# Patient Record
Sex: FEMALE
[De-identification: names, ages, dates, MRNs, and addresses within clinical notes are randomized per-mention and may not be internally consistent; named-entity substitution may affect disease eponyms.]

---

## 2019-04-21 ENCOUNTER — HOSPITAL ENCOUNTER (INPATIENT)
Dept: HOSPITAL 92 - SCSER | Age: 69
LOS: 4 days | Discharge: HOME | DRG: 291 | End: 2019-04-25
Attending: FAMILY MEDICINE | Admitting: FAMILY MEDICINE
Payer: SELF-PAY

## 2019-04-21 VITALS — BODY MASS INDEX: 41.7 KG/M2

## 2019-04-21 DIAGNOSIS — I50.33: ICD-10-CM

## 2019-04-21 DIAGNOSIS — E87.1: ICD-10-CM

## 2019-04-21 DIAGNOSIS — D63.1: ICD-10-CM

## 2019-04-21 DIAGNOSIS — R31.9: ICD-10-CM

## 2019-04-21 DIAGNOSIS — K76.0: ICD-10-CM

## 2019-04-21 DIAGNOSIS — Z79.4: ICD-10-CM

## 2019-04-21 DIAGNOSIS — K80.20: ICD-10-CM

## 2019-04-21 DIAGNOSIS — I13.0: Primary | ICD-10-CM

## 2019-04-21 DIAGNOSIS — N17.9: ICD-10-CM

## 2019-04-21 DIAGNOSIS — K21.9: ICD-10-CM

## 2019-04-21 DIAGNOSIS — E11.22: ICD-10-CM

## 2019-04-21 DIAGNOSIS — R33.9: ICD-10-CM

## 2019-04-21 DIAGNOSIS — N18.9: ICD-10-CM

## 2019-04-21 DIAGNOSIS — N39.0: ICD-10-CM

## 2019-04-21 DIAGNOSIS — Z88.0: ICD-10-CM

## 2019-04-21 DIAGNOSIS — E11.21: ICD-10-CM

## 2019-04-21 LAB
ALBUMIN SERPL BCG-MCNC: 3.3 G/DL (ref 3.4–4.8)
ALP SERPL-CCNC: 140 U/L (ref 40–150)
ALT SERPL W P-5'-P-CCNC: 10 U/L (ref 8–55)
ANION GAP SERPL CALC-SCNC: 14 MMOL/L (ref 10–20)
ANISOCYTOSIS BLD QL SMEAR: (no result) (100X)
AST SERPL-CCNC: 8 U/L (ref 5–34)
BASOPHILS # BLD AUTO: 0.1 THOU/UL (ref 0–0.2)
BASOPHILS NFR BLD AUTO: 1.2 % (ref 0–1)
BILIRUB SERPL-MCNC: 0.3 MG/DL (ref 0.2–1.2)
BUN SERPL-MCNC: 64 MG/DL (ref 9.8–20.1)
CALCIUM SERPL-MCNC: 8.5 MG/DL (ref 7.8–10.44)
CHLORIDE SERPL-SCNC: 102 MMOL/L (ref 98–107)
CK SERPL-CCNC: 33 U/L (ref 29–168)
CO2 SERPL-SCNC: 20 MMOL/L (ref 23–31)
CREAT CL PREDICTED SERPL C-G-VRATE: 0 ML/MIN (ref 70–130)
EOSINOPHIL # BLD AUTO: 0.2 THOU/UL (ref 0–0.7)
EOSINOPHIL NFR BLD AUTO: 2.5 % (ref 0–10)
GLOBULIN SER CALC-MCNC: 4.6 G/DL (ref 2.4–3.5)
GLUCOSE SERPL-MCNC: 108 MG/DL (ref 80–115)
HGB BLD-MCNC: 7.5 G/DL (ref 12–16)
LYMPHOCYTES # BLD: 1.4 THOU/UL (ref 1.2–3.4)
LYMPHOCYTES NFR BLD AUTO: 21.1 % (ref 21–51)
MCH RBC QN AUTO: 25 PG (ref 27–31)
MCV RBC AUTO: 80.9 FL (ref 78–98)
MDIFF COMPLETE?: YES
MONOCYTES # BLD AUTO: 0.4 THOU/UL (ref 0.11–0.59)
MONOCYTES NFR BLD AUTO: 5.2 % (ref 0–10)
NEUTROPHILS # BLD AUTO: 4.8 THOU/UL (ref 1.4–6.5)
NEUTROPHILS NFR BLD AUTO: 70.1 % (ref 42–75)
PLATELET # BLD AUTO: 108 THOU/UL (ref 130–400)
POLYCHROMASIA BLD QL SMEAR: (no result) (100X)
POTASSIUM SERPL-SCNC: 5.1 MMOL/L (ref 3.5–5.1)
RBC # BLD AUTO: 3.01 MILL/UL (ref 4.2–5.4)
SODIUM SERPL-SCNC: 131 MMOL/L (ref 136–145)
TROPONIN I SERPL DL<=0.01 NG/ML-MCNC: 0.01 NG/ML (ref ?–0.03)
TROPONIN I SERPL DL<=0.01 NG/ML-MCNC: 0.02 NG/ML (ref ?–0.03)
WBC # BLD AUTO: 6.8 THOU/UL (ref 4.8–10.8)

## 2019-04-21 PROCEDURE — 83540 ASSAY OF IRON: CPT

## 2019-04-21 PROCEDURE — 36415 COLL VENOUS BLD VENIPUNCTURE: CPT

## 2019-04-21 PROCEDURE — 76770 US EXAM ABDO BACK WALL COMP: CPT

## 2019-04-21 PROCEDURE — 80061 LIPID PANEL: CPT

## 2019-04-21 PROCEDURE — 86901 BLOOD TYPING SEROLOGIC RH(D): CPT

## 2019-04-21 PROCEDURE — 83550 IRON BINDING TEST: CPT

## 2019-04-21 PROCEDURE — 87077 CULTURE AEROBIC IDENTIFY: CPT

## 2019-04-21 PROCEDURE — 87086 URINE CULTURE/COLONY COUNT: CPT

## 2019-04-21 PROCEDURE — 82607 VITAMIN B-12: CPT

## 2019-04-21 PROCEDURE — 85025 COMPLETE CBC W/AUTO DIFF WBC: CPT

## 2019-04-21 PROCEDURE — 93798 PHYS/QHP OP CAR RHAB W/ECG: CPT

## 2019-04-21 PROCEDURE — 83036 HEMOGLOBIN GLYCOSYLATED A1C: CPT

## 2019-04-21 PROCEDURE — 96374 THER/PROPH/DIAG INJ IV PUSH: CPT

## 2019-04-21 PROCEDURE — 80053 COMPREHEN METABOLIC PANEL: CPT

## 2019-04-21 PROCEDURE — 83880 ASSAY OF NATRIURETIC PEPTIDE: CPT

## 2019-04-21 PROCEDURE — 85610 PROTHROMBIN TIME: CPT

## 2019-04-21 PROCEDURE — 80198 ASSAY OF THEOPHYLLINE: CPT

## 2019-04-21 PROCEDURE — 82550 ASSAY OF CK (CPK): CPT

## 2019-04-21 PROCEDURE — 93005 ELECTROCARDIOGRAM TRACING: CPT

## 2019-04-21 PROCEDURE — 82274 ASSAY TEST FOR BLOOD FECAL: CPT

## 2019-04-21 PROCEDURE — 36416 COLLJ CAPILLARY BLOOD SPEC: CPT

## 2019-04-21 PROCEDURE — 93306 TTE W/DOPPLER COMPLETE: CPT

## 2019-04-21 PROCEDURE — 84484 ASSAY OF TROPONIN QUANT: CPT

## 2019-04-21 PROCEDURE — 80048 BASIC METABOLIC PNL TOTAL CA: CPT

## 2019-04-21 PROCEDURE — 83735 ASSAY OF MAGNESIUM: CPT

## 2019-04-21 PROCEDURE — 84100 ASSAY OF PHOSPHORUS: CPT

## 2019-04-21 PROCEDURE — 82746 ASSAY OF FOLIC ACID SERUM: CPT

## 2019-04-21 PROCEDURE — 84540 ASSAY OF URINE/UREA-N: CPT

## 2019-04-21 PROCEDURE — 82728 ASSAY OF FERRITIN: CPT

## 2019-04-21 PROCEDURE — 86900 BLOOD TYPING SEROLOGIC ABO: CPT

## 2019-04-21 PROCEDURE — 85060 BLOOD SMEAR INTERPRETATION: CPT

## 2019-04-21 PROCEDURE — 87186 SC STD MICRODIL/AGAR DIL: CPT

## 2019-04-21 PROCEDURE — 86850 RBC ANTIBODY SCREEN: CPT

## 2019-04-21 PROCEDURE — 82570 ASSAY OF URINE CREATININE: CPT

## 2019-04-21 PROCEDURE — 81001 URINALYSIS AUTO W/SCOPE: CPT

## 2019-04-21 PROCEDURE — 71045 X-RAY EXAM CHEST 1 VIEW: CPT

## 2019-04-21 RX ADMIN — INSULIN HUMAN SCH: 100 INJECTION, SUSPENSION SUBCUTANEOUS at 22:57

## 2019-04-21 NOTE — RAD
Exam: Chest one view



HISTORY:Edema



Comparison: None



FINDINGS:



Lungs: Interstitial opacities bilaterally

Cardiac silhouette:Enlarged

Pulmonary vessels: Engorged

Pleural Spaces: Bilateral pleural fluid

Pneumothorax: None



Osseous abnormalities: None of acuity.



IMPRESSION: Decompensated CHF. Follow-up to resolution recommended.



Reported By: Donell Chapa 

Electronically Signed:  4/21/2019 1:08 PM

## 2019-04-21 NOTE — PDOC.FPRHP
- History


PMHx:


 


PSHx: 





FHx:


 


Social:


 








- Vital signs


BP: []  HR: [] RR: [] Tmax: [] Pox: []% on []  Wt: []   








FMR H&P: Results





- Labs


Result Diagrams: 


 04/21/19 12:50





 04/21/19 12:50


Lab results: 


 











WBC  6.8 thou/uL (4.8-10.8)   04/21/19  12:50    


 


Hgb  7.5 g/dL (12.0-16.0)  L  04/21/19  12:50    


 


Hct  24.4 % (36.0-47.0)  L  04/21/19  12:50    


 


MCV  80.9 fL (78.0-98.0)   04/21/19  12:50    


 


Plt Count  108 thou/uL (130-400)  L  04/21/19  12:50    


 


Neutrophils %  70.1 % (42.0-75.0)   04/21/19  12:50    


 


Sodium  131 mmol/L (136-145)  L  04/21/19  12:50    


 


Potassium  5.1 mmol/L (3.5-5.1)   04/21/19  12:50    


 


Chloride  102 mmol/L ()   04/21/19  12:50    


 


Carbon Dioxide  20 mmol/L (23-31)  L  04/21/19  12:50    


 


BUN  64 mg/dL (9.8-20.1)  H  04/21/19  12:50    


 


Creatinine  3.13 mg/dL (0.6-1.1)  H  04/21/19  12:50    


 


Glucose  108 mg/dL ()   04/21/19  12:50    


 


Calcium  8.5 mg/dL (7.8-10.44)   04/21/19  12:50    


 


Total Bilirubin  0.3 mg/dL (0.2-1.2)   04/21/19  12:50    


 


AST  8 U/L (5-34)   04/21/19  12:50    


 


ALT  10 U/L (8-55)   04/21/19  12:50    


 


Alkaline Phosphatase  140 U/L ()   04/21/19  12:50    


 


Creatine Kinase  33 U/L ()   04/21/19  12:50    


 


B-Natriuretic Peptide  492.9 pg/mL (0-100)  H  04/21/19  12:50    


 


Serum Total Protein  7.9 g/dL (6.0-8.3)   04/21/19  12:50    


 


Albumin  3.3 g/dL (3.4-4.8)  L  04/21/19  12:50    














FMR H&P: A/P





- Problem List


(1) Acute exacerbation of CHF (congestive heart failure)


Current Visit: Yes   Status: Acute   Code(s): I50.9 - HEART FAILURE, 

UNSPECIFIED   





FMR H&P: Upper Level





- Plan


Date/Time: 04/21/19 1406








I, [], have evaluated this patient and agree with findings/plan as outlined by 

intern resident. Pertinent changes/additions are listed here.

## 2019-04-21 NOTE — PDOC.FPRHP
- History of Present Illness


Chief Complaint: leg swelling, sob


History of Present Illness: 





68yo pleasant F with supportive family at [facility] with pmh of CKD, chronic 

hyponatremia presents with 4 week hx of worsening bilat LE edema and SOB. SOB 

is exacerbated at night (though not necessarily by laying down) and edema has 

been intermittent with no transforming factors. Otherwise pt is asymptomatic on 

extensive ROS as listed below. No Hx of CHF, no Hx of COPD.





Of note pt is from [place] and is on travelers visa here since 2016 visiting her 

son. She has no PCP but regularly calls her daughter in [place] who is a doctor 

and occasionally goes to health [place]. She self refers to get labwork done and 

then calls her daughter with the results. Her son reports that he and the 

patients daughter have been trying to manage her healthcare themselves but it 

has "gotten a little out of hand".


ED Course: 





Lasix 80mg IV





- Allergies/Adverse Reactions


 Allergies











Allergy/AdvReac Type Severity Reaction Status Date / Time


 


Penicillins Allergy   Verified 04/21/19 21:44














- Home Medications


 











 Medication  Instructions  Recorded  Confirmed  Type


 


Insulin NPH Hum/Reg Insulin HM 8 unit SQ HS 04/21/19 04/21/19 History





[Novolin 70-30 100 Unit/ml Vial]    


 


Insulin NPH Hum/Reg Insulin HM 10 unit SQ QAM 04/21/19 04/21/19 History





[Novolin 70-30 100 Unit/ml Vial]    


 


Pantoprazole [Protonix] 40 mg PO DAILY 04/21/19 04/21/19 History














- History


PMHx: DM, GERD, fatty liver, chronic cholelithiasis, CKD, chronic hyponatremia, 

"LVF"


 


PSHx: none





FHx: none


 


Social: no T/A/D





Allergy: PCN


 








- Review of Systems


General: reports: fatigue.  denies: fever/chills


Eyes: denies: eye pain, vision changes


ENT: denies: nasal congestion, rhinorrhea


Respiratory: reports: shortness of breath.  denies: cough, congestion


Cardiovascular: reports: edema.  denies: chest pain, palpitation


Gastrointestinal: denies: nausea, vomiting


Genitourinary: denies: incontinence, dysuria


Skin: denies: rashes, lesions


Musculoskeletal: denies: pain, tenderness


Neurological: denies: syncope, seizure


Psychological: denies: anxiety, depression





- Vital signs





/90, HR 72, RR 20, O2 96 on RA, Temp 97.8, weight 106kg





- Physical Exam


Constitutional: NAD, awake, alert and oriented


HEENT: EOMI, grossly normal vision, grossly normal hearing


Neck: supple, trachea midline


Chest: no-tender to palpation


Heart: RRR, other (grade 3/6 holosystolic murmur)


Lungs: good air movement, other (inspiratory crackles bilaterally to mid lung 

lvl)


Abdomen: soft, non-tender


Musculoskeletal: normal structure, normal tone


Neurological: no focal deficit, CN II-XII intact, normal sensation


Skin: no rash/lesions, good turgor


Heme/Lymphatic: no purpura, no petechia


Psychiatric: normal mood and affect, good judgment and insight





FMR H&P: Results





- Labs


Result Diagrams: 


 04/22/19 05:49





 04/22/19 05:49


Lab results: 


 











WBC  6.8 thou/uL (4.8-10.8)   04/21/19  12:50    


 


Hgb  7.5 g/dL (12.0-16.0)  L  04/21/19  12:50    


 


Hct  24.4 % (36.0-47.0)  L  04/21/19  12:50    


 


MCV  80.9 fL (78.0-98.0)   04/21/19  12:50    


 


Plt Count  108 thou/uL (130-400)  L  04/21/19  12:50    


 


Neutrophils %  70.1 % (42.0-75.0)   04/21/19  12:50    


 


Sodium  131 mmol/L (136-145)  L  04/21/19  12:50    


 


Potassium  5.1 mmol/L (3.5-5.1)   04/21/19  12:50    


 


Chloride  102 mmol/L ()   04/21/19  12:50    


 


Carbon Dioxide  20 mmol/L (23-31)  L  04/21/19  12:50    


 


BUN  64 mg/dL (9.8-20.1)  H  04/21/19  12:50    


 


Creatinine  3.13 mg/dL (0.6-1.1)  H  04/21/19  12:50    


 


Glucose  108 mg/dL ()   04/21/19  12:50    


 


Calcium  8.5 mg/dL (7.8-10.44)   04/21/19  12:50    


 


Total Bilirubin  0.3 mg/dL (0.2-1.2)   04/21/19  12:50    


 


AST  8 U/L (5-34)   04/21/19  12:50    


 


ALT  10 U/L (8-55)   04/21/19  12:50    


 


Alkaline Phosphatase  140 U/L ()   04/21/19  12:50    


 


Creatine Kinase  33 U/L ()   04/21/19  12:50    


 


B-Natriuretic Peptide  492.9 pg/mL (0-100)  H  04/21/19  12:50    


 


Serum Total Protein  7.9 g/dL (6.0-8.3)   04/21/19  12:50    


 


Albumin  3.3 g/dL (3.4-4.8)  L  04/21/19  12:50    














FMR H&P: A/P





- Problem List


(1) Acute exacerbation of CHF (congestive heart failure)


Current Visit: Yes   Status: Acute   Code(s): I50.9 - HEART FAILURE, 

UNSPECIFIED   





(2) Diabetes


Current Visit: Yes   Status: Acute   Code(s): E11.9 - TYPE 2 DIABETES MELLITUS 

WITHOUT COMPLICATIONS   





(3) GERD (gastroesophageal reflux disease)


Current Visit: Yes   Status: Acute   Code(s): K21.9 - GASTRO-ESOPHAGEAL REFLUX 

DISEASE WITHOUT ESOPHAGITIS   





(4) Fatty liver


Current Visit: Yes   Status: Acute   Code(s): K76.0 - FATTY (CHANGE OF) LIVER, 

NOT ELSEWHERE CLASSIFIED   





(5) CKD (chronic kidney disease)


Current Visit: Yes   Status: Acute   Code(s): N18.9 - CHRONIC KIDNEY DISEASE, 

UNSPECIFIED   





(6) BLAKE (acute kidney injury)


Current Visit: Yes   Status: Acute   Code(s): N17.9 - ACUTE KIDNEY FAILURE, 

UNSPECIFIED   





(7) Chronic hyponatremia


Current Visit: Yes   Status: Acute   Code(s): E87.1 - HYPO-OSMOLALITY AND 

HYPONATREMIA   





- Plan





Newly diagnosed CHF with exacerbation


A- Pt not in respiratory distress, she is s/p one dose of lovenox. Recs per 

nephro over phone are to start her on 60mg lasix IV BID considering kidney 

function. Pt not currently on ACEi or BB, will hold off on ACEi considering 

renal function and will plan to start BB near discharge as pt is in acute 

exacerbation.


P- Lasix 60mg IV BID


-strict I/O, daily weights


-fluid restriction


-echo in morning





BLAKE on CKD


A- Pt family showed MD logs of prior kidney function with Cr at 1.75 3 years ago

, likely her acute injury is in part due to cardiorenal syndrome


P- avoid nephrotoxic meds


- treat CHF per above


- await nephro recs, recommendations are much appreciated





Normocytic anemia


A- Likely mixed picture of etiology, likely in part due to renal disease


P- B12, folate, iron studies


- monitor h/h





DM


A- pt not on home medication


P- SSI, accuchecks





hyponatremia


A- chronic, pt asymptomatic


P- will fluid restrict and monitor sodium





GERD


- home pantoprazole





fatty liver


- MD aware, f/u outpt





CODE: full code





FMR H&P: Upper Level





- Pertinent history


69 yr old female from [facility] with a PMH of CKD, DM on insulin, and an enlarged 

heart presents with progressively worsening leg swelling and SOB over the last 

4 wks. Her legs swell off and on. Symptoms seem to be worse at night although 

she denies chest pain, PND, orthopnea.


She moved from [facility] to [place] in 2016. She has been receiving all her care and 

medications from [facility], primarily her daughter who is a physician there. She 

does not have a PCP here and they "self refer" to get occasional lab work done. 

They do have a Creatinine from 2016 which was 1.75. In 3/2019, her creatinine 

was 2.6. 


She does not walk- it seems because she doesn't want to. She can stand to 

transfer from bed, wheelchair, to car. 








- Pertinent findings





Gen: Patient sitting peacefully in bed, no apparent distress


Heart: Faint heart sounds but with RRR, no M/G/R noted. 


Lungs: crackles in BLL with diminished breath sounds although she also has 

relatively poor effort. No resp distress


Abd: protuberant 


Ext: 1+ pitting edema in robina feet with trace edema at level of ankle to pre-

tibial. 


Neuro: no focal deficits noted. pupils pinpoint and nonreactive to light.  





Cr. 3.13





HGB 7.5








EKG: Normal sinus rhythm without acute ST changes. 





- Plan


Date/Time: 04/21/19 1814








I, [Audelia Parks], have evaluated this patient and agree with findings/plan as 

outlined by intern resident. Pertinent changes/additions are listed here.





69 yr old female 





acute on chronic vs newly diagnosed CHF in exacerbation 


-has received 80 mg IV lasix in ER. 


-plan to start 60 mg IV lasix BID tomorrow but also appreciate nephro recs


-check ECHO 


-hold lisinopril due to CKD


-Hold B-Blocker while in acute exacerbation, plan to start before DC


-strict I/Os


-heart healthy diet 


-check Fasting lipid panel and start statin therapy as indicated. 


-consult cardiology in AM for further workup of new heart failure 





acute on chronic renal failure


-suspect 2/2 DM 


-monitor closely with daily BMP


-may need to start other diuretics if diuresis not responding to lasix. 


-order labs for FEUrea 


-consult nephrology 





DM 


-check A1C


-qACHS accuchecks


-cont home insulin


-SSI and adjust insulin as needed





Normocytic anemia


-check iron studies, B12, and folic acid


-neg FOBT


-may be 2/2 CKD 





Deconditioning 


-consult PT





Code Status: FULL 


PCP: none 


Dispo: likey > 2 midnights, would likely benedfit from rehab or skilled 

nursing. 





Addendum - Attending





- Attending Attestation


Date/Time: 04/22/19 6117





I personally evaluated the patient and discussed the management with Dr. Wei on 4/21/2019 @1900


I agree with the History, Examination, Assessment and Plan documented above 

with any addition or exceptions noted below - 70 yo female from [place] with h/o 

CKD, chronic hyponatremia, DM, GERD, and possibly CHF (told in past she had 

enlarged heart) who presented c/o increasing SOB and pedal edema for the last 4 

weeks. Denies any CP, fever/chills, PND, or orthopnea. PMH/PSH/Meds/SH reviewed 

and agree with resident's documentation. T97.8  P74  /105  RR17  93% RA  

Exam repeated by me and agree with resident's findings. Labs - WBC=6.8, H/H=7.5/

24.4, Zby=010, MCV=80.4, An=114, K=5.1, Jj=864, CO2=20, BUN/Cr=64/3.13, Wgwm=929

, MXH=664.9, trop I=0.018 -> 0.014 A/P: 1) New onset CHF - Admit to telemetry; 

feeling better after dose of lasix, Continue lasix. Hold ACE inhibitor for now 

due to BLAKE/CKD. Will check echo. Consider cardiology consult. 2) BLAKE with CKD- 3

/19 Cr=2.6 and now 3.13. Nephrology consulted and recommended lasix 60 mg BID. 

Consider renal USG. 3) DM - monitro accuchecks; continue insulin.

## 2019-04-22 LAB
ANION GAP SERPL CALC-SCNC: 13 MMOL/L (ref 10–20)
APTT PPP: 40.3 SEC (ref 22.9–36.1)
BACTERIA UR QL AUTO: (no result) HPF
BASOPHILS # BLD AUTO: 0 THOU/UL (ref 0–0.2)
BASOPHILS NFR BLD AUTO: 0.5 % (ref 0–1)
BUN SERPL-MCNC: 67 MG/DL (ref 9.8–20.1)
CALCIUM SERPL-MCNC: 8.7 MG/DL (ref 7.8–10.44)
CHD RISK SERPL-RTO: 2.3 (ref ?–4.5)
CHLORIDE SERPL-SCNC: 101 MMOL/L (ref 98–107)
CHOLEST SERPL-MCNC: 100 MG/DL
CO2 SERPL-SCNC: 23 MMOL/L (ref 23–31)
CREAT CL PREDICTED SERPL C-G-VRATE: 29 ML/MIN (ref 70–130)
CRYSTAL-AUWI FLAG: 0.1 (ref 0–15)
CRYSTAL-AUWI FLAG: 2.1 (ref 0–15)
EOSINOPHIL # BLD AUTO: 0.1 THOU/UL (ref 0–0.7)
EOSINOPHIL NFR BLD AUTO: 1.6 % (ref 0–10)
GLUCOSE SERPL-MCNC: 147 MG/DL (ref 80–115)
GLUCOSE UR STRIP-MCNC: 100 MG/DL
HDLC SERPL-MCNC: 43 MG/DL
HEV IGM SER QL: 0 (ref 0–7.99)
HEV IGM SER QL: 0.5 (ref 0–7.99)
HGB BLD-MCNC: 7.6 G/DL (ref 12–16)
HYALINE CASTS #/AREA URNS LPF: (no result) LPF
HYALINE CASTS #/AREA URNS LPF: (no result) LPF
INR PPP: 1.1
IRON SERPL-MCNC: 37 UG/DL (ref 50–170)
LDLC SERPL CALC-MCNC: 46 MG/DL
LYMPHOCYTES # BLD: 0.9 THOU/UL (ref 1.2–3.4)
LYMPHOCYTES NFR BLD AUTO: 13.5 % (ref 21–51)
MAGNESIUM SERPL-MCNC: 1.9 MG/DL (ref 1.6–2.6)
MCH RBC QN AUTO: 25.3 PG (ref 27–31)
MCV RBC AUTO: 83.3 FL (ref 78–98)
MONOCYTES # BLD AUTO: 0.3 THOU/UL (ref 0.11–0.59)
MONOCYTES NFR BLD AUTO: 5.1 % (ref 0–10)
NEUTROPHILS # BLD AUTO: 5.1 THOU/UL (ref 1.4–6.5)
NEUTROPHILS NFR BLD AUTO: 79.4 % (ref 42–75)
PATHC CAST-AUWI FLAG: 0.27 (ref 0–2.49)
PATHC CAST-AUWI FLAG: 0.68 (ref 0–2.49)
PLATELET # BLD AUTO: 116 THOU/UL (ref 130–400)
POTASSIUM SERPL-SCNC: 4.9 MMOL/L (ref 3.5–5.1)
PROT UR STRIP.AUTO-MCNC: 100 MG/DL
PROT UR STRIP.AUTO-MCNC: 100 MG/DL
PROTHROMBIN TIME: 14.3 SEC (ref 12–14.7)
RBC # BLD AUTO: 2.98 MILL/UL (ref 4.2–5.4)
RBC UR QL AUTO: (no result) HPF (ref 0–3)
RBC UR QL AUTO: (no result) HPF (ref 0–3)
SODIUM SERPL-SCNC: 132 MMOL/L (ref 136–145)
SP GR UR STRIP: 1.01 (ref 1–1.04)
SP GR UR STRIP: 1.01 (ref 1–1.04)
SPERM-AUWI FLAG: 0 (ref 0–9.9)
SPERM-AUWI FLAG: 0 (ref 0–9.9)
TRIGL SERPL-MCNC: 53 MG/DL (ref ?–150)
UIBC SERPL-MCNC: 281 MCG/DL (ref 265–497)
UUN UR-MCNC: 179 MG/DL
VIT B12 SERPL-MCNC: 1490 PG/ML (ref 211–911)
WBC # BLD AUTO: 6.4 THOU/UL (ref 4.8–10.8)
WBC UR QL AUTO: (no result) HPF (ref 0–3)
YEAST-AUWI FLAG: 153.4 (ref 0–25)
YEAST-AUWI FLAG: 64.7 (ref 0–25)

## 2019-04-22 RX ADMIN — CEFTRIAXONE SCH MLS: 2 INJECTION, POWDER, FOR SOLUTION INTRAMUSCULAR; INTRAVENOUS at 17:40

## 2019-04-22 RX ADMIN — INSULIN HUMAN SCH: 100 INJECTION, SUSPENSION SUBCUTANEOUS at 21:26

## 2019-04-22 RX ADMIN — INSULIN HUMAN SCH UNITS: 100 INJECTION, SUSPENSION SUBCUTANEOUS at 09:38

## 2019-04-22 NOTE — ULT
Exam: Bilateral renal ultrasound



HISTORY: Hematuria



COMPARISON: None



FINDINGS: 

Right kidney: Normal cortical echotexture. No hydronephrosis.

Right kidney measurements: 12.2 x 4.1 x 4.9 cm. The right renal cortical thickness is 1.34 cm.

Left kidney: Normal cortical echotexture. No hydronephrosis

Left kidney measurements: 9.7 x 4.0 x 4.4 cm. The left renal cortical thickness is 9 mm.



Urinary bladder: There is report of an intraluminal Tam; however, no Tam catheter is demonstrated
. The prevoid bladder volume is 422 cc.





IMPRESSION: No focal renal lesion or hydronephrosis. Prevoid bladder volume of 422 cc. No visible Fol
ey catheter is seen.



Reported By: Shaheed Cisneros 

Electronically Signed:  4/22/2019 7:54 AM

## 2019-04-22 NOTE — PDOC.FM
- Subjective


Subjective: 





Breathing improved. No BLE edema. Gross hematuria that started this morning, no 

prior hx of this. No hx of smoking, bladder issues. 





- Objective


Vital Signs & Weight: 


 Vital Signs (12 hours)











  Temp Pulse Resp BP Pulse Ox


 


 04/22/19 04:27  98.7 F  81  18  147/65 H  95


 


 04/22/19 02:45  98.1 F    


 


 04/22/19 00:00  94.9 F L    


 


 04/21/19 23:30   72  14  123/59 L  94 L


 


 04/21/19 22:15  94.4 F L    


 


 04/21/19 21:31  94.2 F L  72  20  140/60  96


 


 04/21/19 20:36      95


 


 04/21/19 19:45   66  18  134/65  96








 Weight











Weight                         106.776 kg














Result Diagrams: 


 04/22/19 05:49





 04/22/19 05:49





Phys Exam





- Physical Examination


Constitutional: NAD


obese


HEENT: moist MMs, sclera anicteric


Neck: full ROM


Respiratory: no wheezing


crackles diffusely


Cardiovascular: RRR, no significant murmur


Gastrointestinal: soft, non-tender


Neurological: non-focal, moves all 4 limbs


Skin: no rash, cap refill <2 seconds





Dx/Plan


(1) Acute exacerbation of CHF (congestive heart failure)


Code(s): I50.9 - HEART FAILURE, UNSPECIFIED   Status: Acute   





(2) BLAKE (acute kidney injury)


Code(s): N17.9 - ACUTE KIDNEY FAILURE, UNSPECIFIED   Status: Acute   





(3) CKD (chronic kidney disease)


Code(s): N18.9 - CHRONIC KIDNEY DISEASE, UNSPECIFIED   Status: Acute   





(4) Chronic hyponatremia


Code(s): E87.1 - HYPO-OSMOLALITY AND HYPONATREMIA   Status: Acute   





(5) Diabetes


Code(s): E11.9 - TYPE 2 DIABETES MELLITUS WITHOUT COMPLICATIONS   Status: Acute

   





(6) GERD (gastroesophageal reflux disease)


Code(s): K21.9 - GASTRO-ESOPHAGEAL REFLUX DISEASE WITHOUT ESOPHAGITIS   Status: 

Acute   





- Plan


Plan: 








#CHF exacerbation


-fits clinical picture, BNP in 500s; however uncertainty as to acute or chronic 

in nature, will obtain TTE to better assess


-s/p 80 mg IV lasix in ER. 


-60 mg IV lasix BID today, appreciate further nephro recs


-holding lisinopril and nephrotoxic medications


-hold lisinopril due to CKD


-high intensity statin indicated due to DM2, ASCVD 14%


-consult cardiology in AM for further workup of new heart failure 





#Acute gross hematuria


-US shows RBCs, protein


-bleeding from  tract, pt asx


-Will consult urology, appreciate recs


-Will check perpiheral blood smear





#BLAKE on CKD


-suspect 2/2 DM


-Cr 3.17 -> 3.07


-order labs for FEUrea 


-consult nephrology 





#IDDM2


-A1c 7.3%, goal <7% given elderly age


-sliding scale to cover for now, will titrate home insulin as needed








#Iron deficiency anemia


-Low serum iron, will start on ferrous sulfate


-Pending  B12, B9 and ferritin


-neg FOBT, needs colonocopy in outpt setting


-may be 2/2 CKD 


-Continue trending with daily CBC





#Physical Deconditioning 


-consult PT





#Chronic hyponatremia


-suspecting dilutional component


-pt clinically stable, continue monitoring








code: full





PLAN: 1)CHF excacerbation- continue diuresis to improve fluid status. Stable 

from respiratory standpoint 2)Normocytic anemia-Pending folate/iron, likely 

renal component 3)BLAKE on CKD- Nephro following, continue trending renal 

function 4) Iron def anemia- H/H low but stable at 7.6/24.8. Start ferrous 

sulfate, continue trending. Suspecting renal component.

## 2019-04-22 NOTE — CON
DATE OF CONSULTATION:  



HISTORY OF PRESENT ILLNESS:  Ms. Hooks is a 69-year-old [place] female, who was

admitted for shortness of breath secondary to decompensated CHF.  We are now being

consulted for acute kidney injury on top of her chronic renal failure.  Her baseline

creatinine when she had her lab work in [facility] was about 1.75 mg%.  She is managed by

her local physician there.  We are now being consulted due to the worsening renal

dysfunction. 



The patient shortness of breath this morning, she is better.  She was started on

Lasix 60 mg IV q.12. 



REVIEW OF SYSTEMS:  Positive for gross hematuria noted with this hospitalization.

Positive for shortness of breath.  No chest pain.  No syncopal episode.  No

productive cough.  No fever or chills.  No dysuria.  No urinary frequency.  No

abdominal pain.  No headache.  No sore throat.  Appetite and energy level are fair.

Occasional joint pains.  No hematochezia.  No melena.  No hematemesis.  No abdominal

pain. 



HOME MEDICATIONS:  Included;

1. Protonix 40 mg daily.

2. Novolin 70/30 of 8 units subcu at bedtime and 10 units subcu q.a.m. 



The patient's current hospital medications includes;

1. Atorvastatin 40 mg at bedtime.

2. Ferrous sulfate 325 mg p.o. b.i.d.

3. Furosemide 60 mg IV q.12.

4. Heparin 5000 units subcu t.i.d..



PAST MEDICAL HISTORY:  Type 2 diabetes mellitus, chronic renal failure secondary to

presumptive diagnosis of diabetic nephropathy. 



PAST SURGICAL HISTORY:  None.



SOCIAL HISTORY:  The patient currently lives with her son, but in [place], she lives

with her daughter.  She is , 2 children.  Housewife.  No IV drug abuse.  No

smoking.  No alcohol intake.  Sedentary lifestyle. 



FAMILY HISTORY:  No family history of ESRD.



ALLERGIES:  NO KNOWN DRUG ALLERGIES PENICILLIN.



TRAUMA:  None.



IMMUNIZATION:  Up-to-date.



HOSPITALIZATION:  Please see past medical history.



PHYSICAL EXAMINATION:

VITAL SIGNS:  Blood pressure is 132/61, heart rate 77, respiratory rate 18,

temperature 97.8, and pulse ox 95%. 

GENERAL:  Noted to be awake, sitting comfortable, not in distress. 

SKIN:  Adequate turgor. 

HEENT:  She has a slightly.  She has had a pinkish conjunctivae.  Anicteric sclerae.

 No neck mass.  No carotid bruits.  No JVD. 

CHEST:  No deformities. 

LUNGS:  Clear to have bibasilar crackles.  No wheezing. 

HEART:  Normal sinus rhythm.  No murmur.  No gallops.  No rubs. 

ABDOMEN:  Globular, soft, nontender.  No masses. 

EXTREMITIES:  Positive for edema.



LABORATORY DATA:  April 22, 2019, urinalysis showed rbc 4 to 6, wbc 21 to 50, urine

protein was 100.  April 21, 2019; sodium 131, potassium 5.1, chloride 102, carbon

dioxide 20, BUN 64, creatinine 3.13, glucose 108, calcium 8.5, AST 8, and ALT 10,

albumin 3.3. 



 and troponin I 0.021. 



April 22, 2019; sodium 132, potassium 4.9, chloride 101, carbon dioxide 23, BUN 67,

creatinine 3.07, iron 37, and TIBC 281.  Next hemoglobin A1c 7.3. 



Renal ultrasound no obstruction.  No masses. 

Chest x-ray decompensated CHF.



ASSESSMENT AND PLAN:  

1. Acute kidney injury on top of her chronic renal failure - consider

hemodynamically mediated dysfunction secondary to her CHF.  For the moment, I will

continue current diuretic regimen.  She still has some bibasilar crackles, although

the shortness of breath is much improved.  We are awaiting a cardiac echo. 

2. Gross hematuria - we will recheck another urinalysis.  Consider Urology consult.

I would probably discontinue the heparin with this patient. 

3. Chronic renal failure with a longstanding history of diabetes mellitus and

proteinuria in the urine, consider diabetic nephropathy. 

4. Shortness of breath - secondary to congestive heart failure.  Cardiac echo has

been ordered.  Agree with current management. 







Job ID:  [id number]

## 2019-04-22 NOTE — CON
DATE OF CONSULTATION:  2019



REASON FOR CONSULTATION:  Consultation was requested for hematuria.



HISTORY OF PRESENT ILLNESS:  The patient is a 69-year-old female, who was 
admitted

with shortness of breath and lower extremity swelling and found to have elevated

creatinine.  She denies any prior history of renal insufficiency.  The history 
is

obtained from the granddaughter and the son as she does not speak English. 



Normally, she has frequency q.2 to 3 hours, nocturia x2 to x3.  She has had 
multiple

urinary tract infections before these were all treated in [place].  The last time 
she

took antibiotics was approximately 2 to 3 months ago and she was not taking

antibiotics upon admission to the hospital.  She has had gross hematuria with 
prior

infections, but she has never had hematuria outside of the time of infections, 
and

normally she also has burning malodor and cloudiness, which she did not know 
upon

admission this time.  Antibiotics typically previously did make it better. 



PAST MEDICAL HISTORY:  Significant for previously mentioned infections, 
breathing

disorder for which she takes an inhaler but has not been diagnosed with 
definitive

lung problem, diabetes for 35 years. 



PAST SURGICAL HISTORY:  She has never had surgery.



PAST OBSTETRICAL HISTORY:  Significant for two vaginal deliveries.  She has not 
had

her period for sometime as she is postmenopausal, but still has uterus and 
ovaries. 



MEDICATIONS:  Include:

1. Insulin.

2. Protonix.



ALLERGIES:  INCLUDE PENICILLIN FOR WHICH SHE GETS A RASH.



REVIEW OF SYSTEMS:  Reveals she had vaginal bleeding previously when she took

Augmentin for urinary tract infection.  She has not had a Pap smear.  She has 
not

had a mammogram.  She has not had a colonoscopy.  She denies any chest pain or

diarrhea.  She did have shortness of breath, which is now better.  She does 
have a

long history of constipation.  She had a bowel movement three days ago.  She is 
not

constipated or at least not feeling constipated at this time. +GERD



SOCIAL HISTORY:  She does not drink, smoke, or use drugs.  She normally lives 
in [place],

but has been on traveller's visa since 2016 and does not see a primary care 
doctor as she

just speaks with her daughter in [place] (a doctor), who usually takes care of 
her.  She has

had lab work through [facility] on occasion.



FAMILY HISTORY:  Significant for mom dying in her 70s after CVA for which she 
was

bedridden for 17 years.  Father  at 95 of prostate enlargement 
complications.

Neither had cancer.  There is no family history of renal insufficiency.  She 
denies

any history of straining to void and does feel like she is emptying adequately. 



PHYSICAL EXAMINATION:

GENERAL:  She is lying comfortably in the bed. 

VITAL SIGNS:  T-max 98.7, blood pressure 124/60, heart rate 78, saturating 95% 
on

room air.  She had at least 2 L out last record and at least 1200 this morning. 

HEART:  Regular rate and rhythm.  No murmurs, gallops, or rubs. 

LUNGS:  Clear with basilar crackles, left greater than right as well as 
intermittent

wheeze noted. 

ABDOMEN:  Obese but soft, nondistended, and nontender with normoactive bowel 
sounds.

 No CVA tenderness. 

EXTREMITIES:  No significant lower extremity edema as of now.  Vulva was mildly

hyperemic from the device collecting her urine, as well as the mucosa itself 
being

normal without any obvious prolapse. 



LABORATORY DATA:  CBC reveals an anemia of 7.6 and 24.8, platelets of 116 which 
is

up from 108.  PT is 14.3, INR 1.1, and PTT 40.3.  BUN and creatinine are 67 and

3.07, and creatinine have been 3.13 the day prior.  Urinae from 2019, 
there

were two specimens collected about 10 hours apart.  One showed 21-50 wbc's, the

other too numerous to count wbc's, one showed 4 to 6 rbc's, the next greater 
than 50

rbc's.  The first showed no bacteria and the second showed rare few bacteria.  
Both

showed no squamous cells as there were good collections.  I have made sure that 
the

culture will be sent from one of these. 



Renal ultrasound 2019, poor images due to patient's body habitus, but

otherwise there is no obvious hydro, stones, or masses.  There is a full bladder

which measured 422 mL remaining.  No lesions noted within the bladder. 



ASSESSMENT:  We have a 69-year-old female with gross hematuria that is likely 
from

urinary tract infection, which we will have to await culture, but I will go 
ahead

and start Rocephin for 3 days just in case since the hematuria is increasing.  
She

may also have incomplete emptying, which could be contributing to her infections
, so

I will await the bladder scan after void to check her PVR.  If it is greater 
than

200mL, I would place a catheter both for maximum drainage as well as to ensure 
that

there is not a contributing component of obstructive uropathy given her renal

insufficiency.  If it is less than 200, then I suspect this is not a 
contributing

factor to her RI, and as long as she is emptying adequately without concerns 
for clot

retention, then the catheter can remain out.  Ultimately, she will likely 
benefit

from outpatient cystoscopy, and we reviewed this as well.  If she has gross 
hematuria

without an obvious infection, then I would get better imaging to include a CAT 
scan

to ensure that we are not missing something with just an ultrasound.  Monitor 
for

now. 







Job ID:  [id number]



MTDD

## 2019-04-22 NOTE — PRG
DATE OF SERVICE:  04/22/2019



SUBJECTIVE:  This is a pleasant 69-year-old lady from [place], now living in the

[place].  For at least the last week, she has noticed increased peripheral

edema and increased shortness of breath.  She had presented to the [facility]

[facility] yesterday with signs and symptoms very consistent with heart failure, as well as

anemia and chronic kidney disease.  She was transferred to our institution for

further evaluation.  En route, she was given one dose of IV Lasix and this did seem

to improve her shortness of breath.  We are in the midst of a large workup

currently.  Symptomatically, she feels better and does not look overtly short of

breath.  We will likely have Cardiology visit with her, as well as continuing a

workup for her anemia.  Her troponins are normal.  Her BNP was elevated to 493.  Her

BUN is 64 with a creatinine of 3.13.  We will continue to evaluate. 







Job ID:  [id number]

## 2019-04-23 LAB
ANION GAP SERPL CALC-SCNC: 15 MMOL/L (ref 10–20)
BASOPHILS # BLD AUTO: 0 THOU/UL (ref 0–0.2)
BASOPHILS NFR BLD AUTO: 0.2 % (ref 0–1)
BUN SERPL-MCNC: 67 MG/DL (ref 9.8–20.1)
CALCIUM SERPL-MCNC: 8.7 MG/DL (ref 7.8–10.44)
CHLORIDE SERPL-SCNC: 101 MMOL/L (ref 98–107)
CO2 SERPL-SCNC: 23 MMOL/L (ref 23–31)
CREAT CL PREDICTED SERPL C-G-VRATE: 29 ML/MIN (ref 70–130)
EOSINOPHIL # BLD AUTO: 0.1 THOU/UL (ref 0–0.7)
EOSINOPHIL NFR BLD AUTO: 1.6 % (ref 0–10)
GLUCOSE SERPL-MCNC: 186 MG/DL (ref 80–115)
HGB BLD-MCNC: 7.9 G/DL (ref 12–16)
LYMPHOCYTES # BLD: 1.3 THOU/UL (ref 1.2–3.4)
LYMPHOCYTES NFR BLD AUTO: 17.2 % (ref 21–51)
MCH RBC QN AUTO: 26.3 PG (ref 27–31)
MCV RBC AUTO: 84.1 FL (ref 78–98)
MONOCYTES # BLD AUTO: 0.4 THOU/UL (ref 0.11–0.59)
MONOCYTES NFR BLD AUTO: 5.4 % (ref 0–10)
NEUTROPHILS # BLD AUTO: 5.5 THOU/UL (ref 1.4–6.5)
NEUTROPHILS NFR BLD AUTO: 75.6 % (ref 42–75)
PLATELET # BLD AUTO: 132 THOU/UL (ref 130–400)
POTASSIUM SERPL-SCNC: 4.8 MMOL/L (ref 3.5–5.1)
RBC # BLD AUTO: 3.01 MILL/UL (ref 4.2–5.4)
SODIUM SERPL-SCNC: 134 MMOL/L (ref 136–145)
WBC # BLD AUTO: 7.3 THOU/UL (ref 4.8–10.8)

## 2019-04-23 PROCEDURE — 0T9B70Z DRAINAGE OF BLADDER WITH DRAINAGE DEVICE, VIA NATURAL OR ARTIFICIAL OPENING: ICD-10-PCS | Performed by: FAMILY MEDICINE

## 2019-04-23 RX ADMIN — INSULIN HUMAN SCH: 100 INJECTION, SUSPENSION SUBCUTANEOUS at 23:35

## 2019-04-23 RX ADMIN — INSULIN HUMAN SCH UNITS: 100 INJECTION, SUSPENSION SUBCUTANEOUS at 09:35

## 2019-04-23 RX ADMIN — CEFTRIAXONE SCH MLS: 2 INJECTION, POWDER, FOR SOLUTION INTRAMUSCULAR; INTRAVENOUS at 16:35

## 2019-04-23 RX ADMIN — Medication SCH ML: at 20:13

## 2019-04-23 RX ADMIN — Medication SCH ML: at 09:43

## 2019-04-23 NOTE — PRG
DATE OF SERVICE:  04/23/2019



Ms. Hooks continues to feel improved.  We are still in the midst of a large

workup for her numerous problems.  She has already been seen in consultation by the

Nephrology Service and the Urology Service.  Urology recommends a treatment of the

UTI, but if the hematuria persists, she would need an outpatient workup to include

CT abdomen and cystoscopy.  We are also awaiting results of an echocardiogram which

if demonstrative of heart failure would necessitate Cardiology consultation to

consider the possibility of cardiac catheterization. 







Job ID:  [id number]

## 2019-04-23 NOTE — PDOC.FM
- Subjective


Subjective: 





NAEO. Breathing improved. Denies dysuria. Still with gross hematuria. 





- Objective


Vital Signs & Weight: 


 Vital Signs (12 hours)











  Temp Pulse Resp BP Pulse Ox


 


 04/23/19 08:42  97.5 F L  84  18  139/65  94 L


 


 04/23/19 04:00  98.3 F  83  22 H  150/67 H  95


 


 04/23/19 00:05  97.7 F  75  18  118/59 L  94 L








 Weight











Weight                         103.147 kg














I&O: 


 











 04/22/19 04/23/19 04/24/19





 06:59 06:59 06:59


 


Intake Total  1930 


 


Output Total  5150 


 


Balance  -3220 











Result Diagrams: 


 04/23/19 05:42





 04/23/19 05:42





Phys Exam





- Physical Examination


Constitutional: NAD


HEENT: PERRLA, moist MMs, sclera anicteric


crackles diffusely


Gastrointestinal: soft, non-tender


Musculoskeletal: no edema


Neurological: non-focal, moves all 4 limbs


Psychiatric: normal affect





Dx/Plan


(1) Acute exacerbation of CHF (congestive heart failure)


Code(s): I50.9 - HEART FAILURE, UNSPECIFIED   Status: Acute   





(2) BLAKE (acute kidney injury)


Code(s): N17.9 - ACUTE KIDNEY FAILURE, UNSPECIFIED   Status: Acute   





(3) CKD (chronic kidney disease)


Code(s): N18.9 - CHRONIC KIDNEY DISEASE, UNSPECIFIED   Status: Acute   





(4) Chronic hyponatremia


Code(s): E87.1 - HYPO-OSMOLALITY AND HYPONATREMIA   Status: Acute   





(5) Diabetes


Code(s): E11.9 - TYPE 2 DIABETES MELLITUS WITHOUT COMPLICATIONS   Status: Acute

   





(6) GERD (gastroesophageal reflux disease)


Code(s): K21.9 - GASTRO-ESOPHAGEAL REFLUX DISEASE WITHOUT ESOPHAGITIS   Status: 

Acute   





- Plan


Plan: 





#CHF exacerbation


-fits clinical picture, BNP in 500s; however uncertainty as to acute or chronic 

in nature, will obtain TTE to better assess


-s/p 80 mg IV lasix in ER. 


-60 mg IV lasix BID today, appreciate further nephro recs


-holding lisinopril and nephrotoxic medications


-hold lisinopril due to CKD


-high intensity statin indicated due to DM2, ASCVD 14%


-consult cardiology in AM for further workup of new heart failure 





#Gram neg. UTI


-Ceftriaxone started


-Pending s/s


-Uro following along


-Pending bladder scan, will insert osorio





#Acute gross hematuria


-US shows RBCs, protein


-bleeding from  tract, pt asx


-Will consult urology, appreciate recs


-Will check perpiheral blood smear





#BLAKE on CKD


-suspect 2/2 DM


-Cr 3.17 -> 3.07


-order labs for FEUrea 


-consult nephrology 





#IDDM2


-A1c 7.3%, goal <7% given elderly age


-sliding scale to cover for now, will titrate home insulin as needed








#Iron deficiency anemia


-Low serum iron, will start on ferrous sulfate


-Pending  B12, B9 and ferritin


-neg FOBT, needs colonocopy in outpt setting


-may be 2/2 CKD 


-Continue trending with daily CBC





#Physical Deconditioning 


-consult PT





#Chronic hyponatremia


-suspecting dilutional component


-pt clinically stable, continue monitoring








code: full





PLAN: 1)CHF excacerbation- continue diuresis to improve fluid status. Stable 

from respiratory standpoint 2)Normocytic anemia-Pending folate/iron, likely 

renal component 3)BLAKE on CKD- Nephro following, continue trending renal 

function 4) Iron def anemia- H/H low but stable at 7.6/24.8. Start ferrous 

sulfate, continue trending. Suspecting renal component.

## 2019-04-23 NOTE — PRG
DATE OF SERVICE:  04/23/2019



SUBJECTIVE:  Ms. Hooks is a 69-year-old  female who was admitted for

shortness of breath secondary to congestive heart failure.  She has been started on

diuretics.  We have been consulted for her acute kidney injury on top of her chronic

renal failure.  Based on the history, her baseline creatinine was 1.75 and she came

in with a creatinine of 3.13.  Her diuretics have been adjusted recently.  She also

developed gross hematuria and Urology has evaluated the patient.  She is being

empirically treated for a UTI.  Recommendation is eventual cystoscopy as an

outpatient.  The patient's breathing is better this morning.  No other complaints. 



OBJECTIVE:  VITAL SIGNS:  Blood pressure 139/65, heart rate 84, respiratory rate 18,

temperature 97.5, pulse ox 94%. 

GENERAL:  Noted to be awake, alert, sitting comfortable, not in distress. 

SKIN:  Adequate turgor. 

HEENT:  She has a slightly pale conjunctivae.  Anicteric sclerae. 

NECK:  No neck mass.  No carotid bruits.  No JVD. 

CHEST:  No deformities. 

LUNGS:  Decreased breath sounds. 

HEART:  Normal sinus rhythm.  No murmur.  No gallops.  No rubs. 

ABDOMEN:  Globular, soft, nontender, no masses. 

EXTREMITIES:  Trace edema.



MEDICATIONS:  Medications of April 23, 2018, was reviewed.



LABORATORY DATA:  Laboratories of April 23, 2019, white count 7.3, hemoglobin 7.9,

sodium 134, potassium 4.8, chloride 101, carbon dioxide 23, BUN 67, creatinine 3.08,

glucose 186, calcium 8.7. 



Urinalysis did show proteinuria.



ASSESSMENT AND PLAN:  

1. Acute kidney injury/chronic renal failure.  The etiology of the renal function is

that she may have a superimposed prerenal azotemia on top of her chronic renal

failure.  With the proteinuria, this suggested that she most likely has underlying

diabetic nephropathy.  Creatinine remained stable.  I am unclear if renal function

will further improve.  I have decided to decrease Lasix from 40 mg IV q.12 to a once

a day dosing. 

2. We will continue current supportive care.

3. Gross hematuria.  Urology has evaluated this patient.  Empiric IV antibiotics

have been started. 

4. Anemia.  Continuing ferrous sulfate.  It is possible that the anemia is most

likely related to her underlying chronic renal failure.  My bias is to give her a

Epogen dosing today.  Continue ferrous sulfate.  Overall, agree with current

management. 

5. Recheck basic metabolic panel and CBC in a.m.







Job ID:  [id number]

## 2019-04-23 NOTE — PRG
DATE OF SERVICE:  04/23/2019



SUBJECTIVE:  The patient had a residual of approximately 394mL by bladder scan 
and so

Tam catheter was placed for hematuric urine the day prior.  At this point, I

started tamsulosin and I had already started Rocephin in anticipation of

infection of the urine.  She has had no problems overnight.  She is

going to have a cardiac workup for presumed CHF.  She has no complaints at this

time. 



OBJECTIVE:  She has been afebrile with vital signs stable, saturating 94% on 
room

air.  She had excellent urine output probably 3 L over the last 24 hours and 
there

is some clots in the tubing, but draining easily with pink-tinged urine that is

otherwise clear to look through in the tubing. 



LABORATORY DATA:  Labs reveal CBC is stable.  Her anemia has not worsened.  Her 
BUN

and creatinine are stable as her creatinine is almost the same as the day prior 
at

3.08.  Culture from the 22nd is growing gram-negative rods at this point. 



ASSESSMENT AND PLAN:  We have a 69-year-old female with urinary tract infection,

most likely causing gross hematuria with incomplete bladder emptying, but not 
kd

retention on whom I would keep the catheter in for at least another 24 to 48 
hours

to allow maximum bladder drainage.  Unfortunately, it does not appear that this 
has contributed

to any of her renal insufficiency.  Continue the Rocephin until cultures are

speciated with sensitivities and change antibiotics appropriately at that time.
  I

have started tamsulosin in anticipation of getting the catheter out before 
discharge

depending on how her course goes.  I reviewed all of this with her family and 
the

patient and all questions answered. 







Job ID:  [id number]



MTDD

## 2019-04-24 LAB
ANION GAP SERPL CALC-SCNC: 13 MMOL/L (ref 10–20)
BASOPHILS # BLD AUTO: 0.1 THOU/UL (ref 0–0.2)
BASOPHILS NFR BLD AUTO: 1.6 % (ref 0–1)
BUN SERPL-MCNC: 66 MG/DL (ref 9.8–20.1)
CALCIUM SERPL-MCNC: 8.7 MG/DL (ref 7.8–10.44)
CHLORIDE SERPL-SCNC: 102 MMOL/L (ref 98–107)
CO2 SERPL-SCNC: 24 MMOL/L (ref 23–31)
CREAT CL PREDICTED SERPL C-G-VRATE: 28 ML/MIN (ref 70–130)
EOSINOPHIL # BLD AUTO: 0.1 THOU/UL (ref 0–0.7)
EOSINOPHIL NFR BLD AUTO: 1.7 % (ref 0–10)
GLUCOSE SERPL-MCNC: 139 MG/DL (ref 80–115)
HGB BLD-MCNC: 7.8 G/DL (ref 12–16)
LYMPHOCYTES # BLD: 1.3 THOU/UL (ref 1.2–3.4)
LYMPHOCYTES NFR BLD AUTO: 17 % (ref 21–51)
MCH RBC QN AUTO: 25.5 PG (ref 27–31)
MCV RBC AUTO: 85 FL (ref 78–98)
MDIFF COMPLETE?: YES
MONOCYTES # BLD AUTO: 0.5 THOU/UL (ref 0.11–0.59)
MONOCYTES NFR BLD AUTO: 6.8 % (ref 0–10)
NEUTROPHILS # BLD AUTO: 5.5 THOU/UL (ref 1.4–6.5)
NEUTROPHILS NFR BLD AUTO: 73 % (ref 42–75)
PLATELET # BLD AUTO: 148 THOU/UL (ref 130–400)
POLYCHROMASIA BLD QL SMEAR: (no result) (100X)
POTASSIUM SERPL-SCNC: 4.8 MMOL/L (ref 3.5–5.1)
RBC # BLD AUTO: 3.05 MILL/UL (ref 4.2–5.4)
SODIUM SERPL-SCNC: 134 MMOL/L (ref 136–145)
WBC # BLD AUTO: 7.5 THOU/UL (ref 4.8–10.8)

## 2019-04-24 RX ADMIN — INSULIN HUMAN SCH: 100 INJECTION, SUSPENSION SUBCUTANEOUS at 11:30

## 2019-04-24 RX ADMIN — INSULIN HUMAN SCH: 100 INJECTION, SUSPENSION SUBCUTANEOUS at 20:07

## 2019-04-24 RX ADMIN — INSULIN HUMAN SCH UNITS: 100 INJECTION, SUSPENSION SUBCUTANEOUS at 09:02

## 2019-04-24 RX ADMIN — Medication SCH ML: at 09:01

## 2019-04-24 RX ADMIN — Medication SCH ML: at 20:08

## 2019-04-24 NOTE — PRG
DATE OF SERVICE:  04/24/2019



SUBJECTIVE:  Ms. Hooks is a 69-year-old  female admitted for CHF.  She was

diuresed.  We are following her up for chronic renal failure.  Creatinine is

relatively stable for the last several days.  I feel that this creatinine of 3.0-3.1

may be her baseline. 



No other complaints today.  She was also noted to have a gross hematuria.  Urology

has evaluated this patient.  She has been empirically treated for UTI. 



Stool cards for occult blood, currently pending. 



Addendum; results were negative. 



No other complaints today.



OBJECTIVE:  VITAL SIGNS:  Blood pressure 127/59, heart rate 80, respiratory rate 18,

temperature 98.2, pulse ox 95%. 

GENERAL:  Awake, alert, comfortable, not in distress. 

SKIN:  Adequate turgor. 

HEENT:  She has a slightly pale conjunctivae.  Anicteric sclerae.  No neck mass.  No

carotid bruits.  No JVD. 

CHEST:  No deformities. 

LUNGS:  She does have bibasilar crackles.  No wheezing. 

HEART:  Normal sinus rhythm.  No murmurs, gallops, or rubs. 

ABDOMEN:  Globular, soft, nontender, no masses. EXTREMITIES:  Positive for edema.



MEDICATIONS:  Medications of April 24, 2019, reviewed.



LABORATORY DATA:  April 23, 2019, white count 7.3, hemoglobin 7.9. April 24, 2019,

sodium 134, potassium 4.8, chloride 102, carbon dioxide 24, BUN 66, creatinine 3.02,

glucose 139, calcium 8.7. 



ASSESSMENT AND PLAN:  

1. Acute kidney injury/chronic renal failure-renal function has been stable at about

3.01.  This may be her new baseline.  Continue current management.  Continue

diuretic regimen.  No indication for any dialytic intervention. 

2. Anemia.  P.r.n. blood transfusion.  Continuing weekly Epogen and daily ferrous

sulfate. 

3. Gross hematuria-secondary to presumptive urinary tract infection.  Eventually,

this patient might need a cystoscopy in the outpatient setting.  Urology is

following, being treated for urinary tract infection with IV antibiotics. 

4. Congestive heart failure, clinically improving on diuretic regimen.  Awaiting

cardiac echo. 

5. Please note that the patient's chronic renal failure is most likely from diabetic

nephropathy. 

6. Recheck basic metabolic panel and CBC in a.m.







Job ID:  [id number]

## 2019-04-24 NOTE — PRG
DATE OF SERVICE:  04/24/2019



SUBJECTIVE:  The patient did well overnight.  She has no complaints.  The 
catheter

still in it and draining fine, however, does appear to still be cherry tinged, 
but

still see-through.  No further clots were noted in the bag or tubing. 



OBJECTIVE:  Her vitals have been stable with good urine output.  As mentioned 
the

urine is still cherry in color but see-through in the tubing. 



LABORATORY DATA:  Her CBC was not recheck today, but her creatinine is stable at

3.02 and the urine culture has grown Citrobacter koseri, which is sensitive to 
the

Rocephin, which she is on. 



ASSESSMENT:  We have a 69-year-old female with gross hematuria secondary to 
urinary

tract infection on appropriate antibiotics at this time with incomplete emptying
,

but not kd retention.  With an indwelling Tam, I would leave the Tam in 
while

the urine is still hematuric.  I have her on tamsulosin already and we will 
continue

the Rocephin for now. 







Job ID:  [id number]



MTDD

## 2019-04-24 NOTE — PRG
DATE OF SERVICE:  04/24/2019



Ms. Hooks is sitting in bed quietly in no distress.  Her HP and chest x-ray as

well as BMP are all very consistent with heart failure.  We will ask Cardiology to

consult on the patient given the fact she may need cardiac catheterization given

new-onset heart failure.  In the event with IV Lasix, she is improved substantially,

and we will likely cautiously begin ACE inhibitors and beta blockers at a later date

along with Cardiology. 







Job ID:  [id number]

## 2019-04-24 NOTE — CON
DATE OF CONSULTATION:  04/24/2019



REASON FOR CONSULTATION:  Congestive heart failure.



HISTORY OF PRESENT ILLNESS:  Ms. Hooks is a pleasant 69-year-old [place] woman.

History is obtained from her son.  He states her main complaint has been shortness

of breath in addition to edema.  No chest pain or pressure noted.  She did have

previous kidney issues with a GFR of 29, diagnosed in 2016.  It has worsened.  She

has had a limited coverage from a medical standpoint.  After presenting with the

above, she was found to have a markedly elevated creatinine and anemia.  She was

also told in the past, she had cardiomyopathy, although on her recent echo, her LVEF

appeared normal. 



PAST MEDICAL HISTORY:  As above, including diabetes mellitus for 35 years,

acute-on-chronic kidney disease. 



PAST SURGICAL HISTORY:  None.



SOCIAL HISTORY:  Currently lives with her son.



FAMILY HISTORY:  Negative.



ALLERGIES:  NONE.



REVIEW OF SYSTEMS:  A 10-point review of systems is reviewed as above, otherwise

negative. 



OBJECTIVE:  GENERAL:  The patient is a pleasant 69-year-old, who is in no acute

distress.  The patient appears their stated age. 

VITAL SIGNS:  Blood pressure 150/65, pulse 85, temperature 97.6. 

NEUROLOGIC:  The patient is alert and oriented x3 with no focal neurologic deficits. 

HEENT:  Sclerae without icterus.  Mouth has moist mucous membranes with normal

pallor. 

NECK:  No JVD.  Carotid upstroke brisk.  No bruits bilaterally. 

LUNGS:  Clear to auscultation with unlabored respirations. 

BACK:  No scoliosis or kyphosis. 

CARDIAC:  Regular rate and rhythm with normal S1 and S2.  No S3 or S4 noted.  No

significant rubs, murmurs, thrills, or gallops noted throughout the precordium.  PMI

is not displaced.  There is no parasternal heave. 

ABDOMEN:  Soft, nontender, nondistended.  No peritoneal signs present.  No

hepatosplenomegaly.  No abnormal striae. 

EXTREMITIES:  2+ femoral and 2+ dorsalis pedis pulses.  No cyanosis, clubbing, or

edema. 

SKIN:  No gross abnormalities.



PERTINENT LABORATORY DATA:  Hemoglobin 7.8, platelet count 148, creatinine 3.02 and

is unchanged from 04/21/2019.  Echo Doppler as described above. 



IMPRESSION:  

1. Shortness of breath.

2. ? congestive heart failure.

3. Anemia.



RECOMMENDATIONS:  Ms. Hooks's symptoms likely related to underlying kidney

disease.  Her LVEF appears normal.  She does have some diastolic dysfunction.  My

suspicion is her renal disease also causing her underlying anemia based on the

limited erythropoietin.  We will leave a diuretic therapy to the discretion of Dr. Salcido.  She did have crackles on her exam, but I am concerned about Lasix given

potential for worsening renal function.  We will discuss with Dr. Salcido, on my

findings from a cardiac standpoint in a.m., which may help guide his therapy. 







Job ID:  [id number]

## 2019-04-24 NOTE — PDOC.FM
- Subjective


Subjective: 





Improved breathing. No dysuria, no other complaints. 





- Objective


MAR Reviewed: Yes


Vital Signs & Weight: 


 Vital Signs (12 hours)











  Temp Pulse Resp BP Pulse Ox


 


 04/24/19 04:02  98.2 F  80  18  127/59 L  95


 


 04/23/19 23:45   87  19  123/57 L 








 Weight











Weight                         103.147 kg














I&O: 


 











 04/23/19 04/24/19 04/25/19





 06:59 06:59 06:59


 


Intake Total 1930 1490 


 


Output Total 5150 1975 


 


Balance -3220 -485 











Result Diagrams: 


 04/24/19 06:44





 04/24/19 06:44





Phys Exam





- Physical Examination


Constitutional: NAD


HEENT: PERRLA, sclera anicteric


pulmonary crackles, no respiratory distress


Cardiovascular: RRR, no significant murmur


Gastrointestinal: soft, non-tender


Neurological: non-focal, moves all 4 limbs





Dx/Plan


(1) Acute exacerbation of CHF (congestive heart failure)


Code(s): I50.9 - HEART FAILURE, UNSPECIFIED   Status: Acute   





(2) BLAKE (acute kidney injury)


Code(s): N17.9 - ACUTE KIDNEY FAILURE, UNSPECIFIED   Status: Acute   





(3) CKD (chronic kidney disease)


Code(s): N18.9 - CHRONIC KIDNEY DISEASE, UNSPECIFIED   Status: Acute   





(4) Chronic hyponatremia


Code(s): E87.1 - HYPO-OSMOLALITY AND HYPONATREMIA   Status: Acute   





(5) Diabetes


Code(s): E11.9 - TYPE 2 DIABETES MELLITUS WITHOUT COMPLICATIONS   Status: Acute

   





(6) GERD (gastroesophageal reflux disease)


Code(s): K21.9 - GASTRO-ESOPHAGEAL REFLUX DISEASE WITHOUT ESOPHAGITIS   Status: 

Acute   





- Plan


Plan: 





#CHF exacerbation


-fits clinical picture, BNP in 500s; however uncertainty as to acute or chronic 

in nature, will obtain TTE to better assess


-s/p 80 mg IV lasix in ER. 


-60 mg IV lasix BID today, appreciate further nephro recs


-holding lisinopril and nephrotoxic medications


-hold lisinopril due to CKD


-high intensity statin indicated due to DM2, ASCVD 14%


-cardiology consult pending echo





#Citorbacter koseri UTI


-Sensitive to Rocephin


-Uro following along





#Acute gross hematuria


-US shows RBCs, protein


-bleeding from  tract, pt asx, H/H stable 


-Will check perpiheral blood smear


-Continue osorio, tamsulosin started, 24-48 hr w/ osorio


-Dr. Torres following along


-Output cystoscopy





#BLAKE on CKD


-suspect 2/2 DM


-Cr 3.17 -> 3.07 ->3.08, stable


-order labs for FEUrea 


-Nephrology following along





#IDDM2


-A1c 7.3%, goal <7% given elderly age


-sliding scale to cover for now, will titrate home insulin as needed








#Iron deficiency anemia


-Low serum iron, will start on ferrous sulfate


-Pending  B12, B9 and ferritin


-neg FOBT, needs colonocopy in outpt setting


-may be 2/2 CKD 


-Continue trending with daily CBC





#Physical Deconditioning 


-consult PT





#Chronic hyponatremia


-suspecting dilutional component


-Na stable at 134


-pt clinically stable, continue monitoring








code: full





PLAN: 1)CHF excacerbation- continue diuresis to improve fluid status. Stable 

from respiratory standpoint 2)Normocytic anemia-Pending folate/iron, likely 

renal component 3)BLAKE on CKD- Nephro following, continue trending renal 

function 4) Iron def anemia- H/H low but stable at 7.6/24.8. Start ferrous 

sulfate, continue trending. Suspecting renal component.

## 2019-04-25 VITALS — SYSTOLIC BLOOD PRESSURE: 168 MMHG | DIASTOLIC BLOOD PRESSURE: 74 MMHG

## 2019-04-25 VITALS — TEMPERATURE: 97.8 F

## 2019-04-25 LAB
ANION GAP SERPL CALC-SCNC: 14 MMOL/L (ref 10–20)
BASOPHILS # BLD AUTO: 0 THOU/UL (ref 0–0.2)
BASOPHILS NFR BLD AUTO: 0.7 % (ref 0–1)
BUN SERPL-MCNC: 64 MG/DL (ref 9.8–20.1)
CALCIUM SERPL-MCNC: 9 MG/DL (ref 7.8–10.44)
CHLORIDE SERPL-SCNC: 103 MMOL/L (ref 98–107)
CO2 SERPL-SCNC: 22 MMOL/L (ref 23–31)
CREAT CL PREDICTED SERPL C-G-VRATE: 31 ML/MIN (ref 70–130)
EOSINOPHIL # BLD AUTO: 0.2 THOU/UL (ref 0–0.7)
EOSINOPHIL NFR BLD AUTO: 2.2 % (ref 0–10)
GLUCOSE SERPL-MCNC: 153 MG/DL (ref 80–115)
HGB BLD-MCNC: 7.9 G/DL (ref 12–16)
LYMPHOCYTES # BLD: 1.2 THOU/UL (ref 1.2–3.4)
LYMPHOCYTES NFR BLD AUTO: 17.1 % (ref 21–51)
MAGNESIUM SERPL-MCNC: 1.6 MG/DL (ref 1.6–2.6)
MCH RBC QN AUTO: 26.3 PG (ref 27–31)
MCV RBC AUTO: 84.8 FL (ref 78–98)
MONOCYTES # BLD AUTO: 0.5 THOU/UL (ref 0.11–0.59)
MONOCYTES NFR BLD AUTO: 6.4 % (ref 0–10)
NEUTROPHILS # BLD AUTO: 5.3 THOU/UL (ref 1.4–6.5)
NEUTROPHILS NFR BLD AUTO: 73.5 % (ref 42–75)
PLATELET # BLD AUTO: 136 THOU/UL (ref 130–400)
POTASSIUM SERPL-SCNC: 4.9 MMOL/L (ref 3.5–5.1)
RBC # BLD AUTO: 3.02 MILL/UL (ref 4.2–5.4)
SODIUM SERPL-SCNC: 134 MMOL/L (ref 136–145)
WBC # BLD AUTO: 7.2 THOU/UL (ref 4.8–10.8)

## 2019-04-25 RX ADMIN — INSULIN HUMAN SCH UNIT: 100 INJECTION, SUSPENSION SUBCUTANEOUS at 09:31

## 2019-04-25 RX ADMIN — Medication SCH ML: at 09:45

## 2019-04-25 NOTE — PRG
DATE OF SERVICE:  04/25/2019



Ms. Hooks is awake, alert, and sitting in bed.  She is in no distress.  She was

seen in consultation by Dr. Hawk, who felt the most of her symptoms were

related to her CKD.  In the event, she will be maintained on Lasix.  She will

arrange for outpatient workup of her ongoing problems and for monitoring of her CKD.

 She will also be placed on erythropoietin for her anemia, which likely is related

to her renal insufficiency. 







Job ID:  [id number]

## 2019-04-25 NOTE — PRG
DATE OF SERVICE:  04/25/2019



SUBJECTIVE:  The patient's cardiac workup was negative and her ejection fraction was

actually good.  So, her admission seems to be more related to renal insufficiency

with UTI, incomplete emptying, and gross hematuria.  She is doing well and has no

complaints.  Tam is still in and draining pink-tinged urine that is more clear

than yesterday.  Given her heart, does not need further investigation or monitoring,

they are eager to get her out of the hospital, so we reviewed that I would remove

the catheter right now; however, that is concerning since it is midday because that

she needs to stay long enough to void and make sure she is emptying alright.  She

did have a bowel movement this morning, so she is not constipated.  I emphasized the

need to void this as that makes it harder to empty and she is already on tamsulosin,

so hopefully that will also help with emptying. 



OBJECTIVE:  VITAL SIGNS:  Vitals have been stable.  She had 775 urine output over

the last 12 to 24 hours.  Creatinine is down a little bit.  On exam, Tam catheter

had pink-tinged urine and was removed by myself at the bedside. 



LABORATORY DATA:  Creatinine 2.79.  CBC was normal and stable from her blood count.



ASSESSMENT AND PLAN:  We have a 69-year-old female, admitted with shortness of

breath and lower extremity swelling that seems to be likely a worsening of her renal

insufficiency associated with urinary tract infection, hematuria, and incomplete

emptying.  The catheter has now been removed.  She must stay long enough to void and

make sure she has less than 250 in the bladder.  If there is more than that, we will

come out with a plan.  She will continue on tamsulosin and Cipro as an outpatient.

She can follow up with me for cystoscopy.  We reviewed all of this in detail.  All

questions were answered. 







Job ID:  [id number]

## 2019-04-25 NOTE — PDOC.CTH
Cardiology Progress Note





- Subjective





No changes noted overnight





- Objective


 Vital Signs











  Temp Pulse Resp BP Pulse Ox


 


 04/25/19 04:25  97.5 F L  69  18  151/67 H  93 L


 


 04/24/19 20:00  97.6 F  74  18  130/62  94 L








 











Weight                         227 lb 6.4 oz














 











 04/23/19 04/24/19 04/25/19





 06:59 06:59 06:59


 


Intake Total 1930 1490 790


 


Output Total 5150 1975 775


 


Balance -3220 -485 15














- Physical Examination


General/Neuro: alert & oriented x3, NAD


Neck: no JVD present


Lungs: CTA, unlabored respirations


Heart: PMI normal, RRR


Abdomen: NT/ND


Extremities: + femoral B





- Telemetry


Telemetry Rhythm: SR





- Labs


Result Diagrams: 


 04/25/19 05:59





 04/25/19 05:59


 Troponin/CKMB











Troponin I  0.021 ng/mL (< 0.028)   04/21/19  19:05    














- Assessment/Plan





Acute on chronic diastolic dysfunction


Acute on chronic renal infuficiency


chronic anemia


SOB





SOB likely multifactorial


Pt with normal EF on echo


Diuresis per nephrology


Add low dose coreg


No other recommendations;  If difficulty with diuresis, may need dialysis

## 2019-04-25 NOTE — PDOC.FM
- Subjective


Subjective: 





NAEO, no breathign problems Ready to go home. 





- Objective


MAR Reviewed: Yes


Vital Signs & Weight: 


 Vital Signs (12 hours)











  Temp Pulse Resp BP Pulse Ox


 


 04/25/19 04:25  97.5 F L  69  18  151/67 H  93 L


 


 04/24/19 20:00  97.6 F  74  18  130/62  94 L








 Weight











Weight                         103.147 kg














I&O: 


 











 04/23/19 04/24/19 04/25/19





 06:59 06:59 06:59


 


Intake Total 1930 1490 790


 


Output Total 5150 1975 775


 


Balance -3220 -485 15











Result Diagrams: 


 04/25/19 05:59





 04/25/19 05:59





Phys Exam





- Physical Examination


Constitutional: NAD


HEENT: PERRLA, oral pharynx no lesions


Respiratory: no wheezing


crackles in lungs


Cardiovascular: RRR, no significant murmur


Gastrointestinal: soft


Neurological: non-focal, moves all 4 limbs





Dx/Plan


(1) Acute exacerbation of CHF (congestive heart failure)


Code(s): I50.9 - HEART FAILURE, UNSPECIFIED   Status: Acute   





(2) BLAKE (acute kidney injury)


Code(s): N17.9 - ACUTE KIDNEY FAILURE, UNSPECIFIED   Status: Acute   





(3) CKD (chronic kidney disease)


Code(s): N18.9 - CHRONIC KIDNEY DISEASE, UNSPECIFIED   Status: Acute   





(4) Chronic hyponatremia


Code(s): E87.1 - HYPO-OSMOLALITY AND HYPONATREMIA   Status: Acute   





(5) Diabetes


Code(s): E11.9 - TYPE 2 DIABETES MELLITUS WITHOUT COMPLICATIONS   Status: Acute

   





(6) GERD (gastroesophageal reflux disease)


Code(s): K21.9 - GASTRO-ESOPHAGEAL REFLUX DISEASE WITHOUT ESOPHAGITIS   Status: 

Acute   





- Plan


Plan: 





#Fluid overload 2/2 acute vs. chronic kidney injury


-fits clinical picture, BNP in 500s; however uncertainty as to acute or chronic 

in nature, will obtain TTE to better assess


-holding lisinopril and nephrotoxic medications


-high intensity statin indicated due to DM2, ASCVD 14%


-Continue lasix IV per nephro


-Echo with normal EF, some diastolic HF.  Likely fluid overload is 2/2 to acute 

vs. chronic renal disease





#CKD likely 2/2 diabetic nephropathy


-Cr stable at around 3, probably new baseline


-Nephro following along


-Pending recs for lasix regimen





#Citorbacter koseri UTI


-Sensitive to Rocephin, will transition to PO abx


-Uro following along





#Acute urinary retention


-Keep osorio in place while urine is hematuric





#Hematuria, proteinuria


-US shows RBCs, protein


-bleeding from  tract, pt asx, H/H stable 


-Will check perpiheral blood smear


-Continue osorio, tamsulosin started, 24-48 hr w/ osorio


-Dr. Torres following along


-Output cystoscopy





#IDDM2


-A1c 7.3%, goal <7% given elderly age


-sliding scale to cover for now, will titrate home insulin as needed





#Iron deficiency anemia


-Low serum iron, will start on ferrous sulfate


-Pending  B12, B9 and ferritin


-neg FOBT, needs colonocopy in outpt setting


-may be 2/2 CKD 


-Continue trending with daily CBC


-continue qweekly EPO per nephro





#Physical Deconditioning 


-PT on board





#Chronic hyponatremia


-suspecting dilutional component


-Na stable at 134


-pt clinically stable, continue monitoring








code: full





PLAN: 1) Fluid overload 2/2 diabetic nephropathy-Continue diuresis with lasix 

due to presence of pulm edema.Transition to PO.  Pt non hypoxic on RA. 2) 

Citrobacter UTI-continue rocephin 3) Acute urinary retention- On floxmax, 

continue osorio with hematuria 4) IDDM2-pt refusing insulin, will discuss

## 2019-04-25 NOTE — PRG
DATE OF SERVICE:  04/25/2019



SUBJECTIVE:  Ms. Hooks is a 69-year-old  female who was admitted for

decompensated congestive heart failure.  She was diuresed.  We were consulted for

her chronic renal failure.  Her initial creatinine was ranging in the low 3 mg%.

Adjustment with diuretics have been made.  She is now on once a day dosing.  She is

feeling better.  No chest pain or shortness of breath.  Cardiac echo was done, which

showed normal EF, but some suggestion of diastolic dysfunction.  No other complaints

today.  She is feeling better.  In the interim, she developed gross hematuria and

she has been treated for UTI.  Plan is if the gross hematuria is persistent, she

will have an outpatient cystoscopy. 



OBJECTIVE:  VITAL SIGNS:  Blood pressure 151/67, heart rate 69, respiratory rate 18,

temperature 97.5 and pulse ox 93%. 

GENERAL:  Noted to be awake, alert, comfortable, not in distress. 

SKIN:  Adequate turgor. 

HEENT:  She has slightly pale conjunctivae.  Anicteric sclerae.  No neck mass.  No

carotid bruits.  No JVD. 

CHEST:  No deformities. 

LUNGS:  Clear breath sounds. 

HEART:  Normal sinus rhythm.  No murmur.  No gallops.  No rubs. 

ABDOMEN:  Globular, soft, nontender.  No masses. 

EXTREMITIES:  Trace edema.  No deformities.



MEDICATIONS:  Of April 25, 2019, reviewed.



LABORATORY DATA:  Of April 25, 2019, white count 7.2, hemoglobin 7.9 sodium 134,

potassium 4.9, chloride 103, carbon dioxide 22, BUN 64, creatinine 2.79, glucose

153, calcium 9.0, and magnesium 1.6. 



ASSESSMENT AND PLAN:  

1. Chronic renal failure/acute kidney injury, slightly improved creatinine from 3.02

to a most recent value of 2.79.  GFR is now 17 mL/minute.  Continue current

management.  Please note she is on a decreased dose of her Lasix.  My plan is to

convert Lasix to 40 mg tab - p.o.  No indication for any dialytic intervention. 

2. Anemia.  Stool cards negative.  Currently on iron supplementation.  We will start

patient on Epogen 7500 units subcutaneous every week. 

3. Congestive heart failure and diastolic dysfunction.  Clinically much improved.

Continue current diuretic regimen. 

4. Overall agree with current management.







Job ID:  [id number]

## 2019-04-26 NOTE — DIS
DATE OF ADMISSION:  2019



DATE OF DISCHARGE:  2019



ADMITTING ATTENDING:  Kathy Melchor MD



CONSULTS:  

1. Cardiology, Dr. Hawk.

2. Urology, Dr. Torres.

3. Nephrology, Dr. Angelito Salcido.



PROCEDURES AND IMAGIN. Chest x-ray:  Decompensated CHF.

2. Renal ultrasound:  No focal renal lesion or hydronephrosis.  Renal ultrasound was

ordered for new onset hematuria. 



PRIMARY DIAGNOSES:  

1. Fluid overload secondary to diastolic congestive heart failure exacerbation and

acute on chronic renal insufficiency. 

2. Newly-diagnosed diastolic heart failure.

3. Acute kidney injury versus chronic kidney disease , likely chronic kidney disease.

4. Gross hematuria.



SECONDARY DIAGNOSES:  

1. Chronic hyponatremia.

2. Gastroesophageal reflux disease.

3. Nonalcoholic fatty liver disease.

4. Diabetes type 2.

5. Normocytic anemia.



DISCHARGE MEDICATIONS:  

1. Ciprofloxacin 250 mg p.o. b.i.d. for 3 days.

2. Lasix 40 mg p.o. daily.

3. Flomax 0.4 mg p.o. daily.

4. Ferrous sulfate 325 mg p.o. b.i.d. with meals.

5. EPO 7500 units subcu q.7 days.

6. Colace 100 mg p.o. b.i.d.

7. Coreg 3.125 mg p.o. b.i.d.

8. Tums 1000 mg p.o. q.4 hours p.r.n. for dyspepsia.

9. Lipitor 40 mg p.o. at bedtime.

10. Protonix 40 mg p.o. daily.

11. Novolin 70/30, 8 units subcu at bedtime.

12. Novolin 70/30, 10 units subcu q.a.m.



DISCONTINUED MEDICATIONS:  Recommendations are made to discontinue natural

supplements. 



HISTORY AND HOSPITAL COURSE:  Ms. Hooks is a pleasant 69-year-old  female

who presented to the ER for acute onset edema and short of breath.  She has not

established with a PCP in community, but has been receiving medical care through her

daughter in [place] who is a doctor, occasionally receiving intermittent labs and

being placed on certain medications as such.  When she presented, she was short of

breath.  Chest x-ray showed decompensated CHF.  The patient denied a history of CHF

and has not had an echo.  She was not hypoxic on room air and given Lasix in which

she diuresed well over the course of a couple of days.  From respiratory standpoint,

she did improve with the diuretics.  Echo surprisingly showed only diastolic heart

failure.  The patient was started on Coreg as such.  In addition, the patient had an

increase in her creatinine.  It was unsure whether this was an BLAKE versus CKD.  Upon

reviewing paperwork from lab done a month ago, it looks like she had a baseline

creatinine of 2 and here was 3, so likely she had an BLAKE superimposed on chronic

kidney disease.  She also has a history of type 2 diabetes in which she takes

insulin.  Her A1c this admission was 7.3.  Her urine also showed a mixed picture of

protein and white blood cells, and she was treated for UTI.  The next day, the

patient did experience gross hematuria in her Tam bladder, but no UTI type

symptoms.  Of note, she did reveal that she has a history of recurrent UTIs, likely

thought due to acute urinary retention and stasis.  Dr. Torres was consulted who

recommended outpatient cystoscopy.  It was also thought that her hematuria could be

due to infection.  However, since hematuria did not improve with antibiotics, it is

likely that there could be underlying malignancy.  Renal ultrasound was negative for

any type of renal involvement or masses.  Instructions were given to the patient to

follow up outpatient for further workup. 



The patient's creatinine did improve to the range of 2s and thus resolving her BLAKE.

She was instructed to continue Lasix, to continue pulling off fluid and follow up

with Nephrology due to a very low GFR of 17.  Her nephrologist thought that her CKD

is likely due to her uncontrolled type 2 diabetes.  She was not placed on an ACE or

an ARB due to her to low GFR, but decision was made to optimize medical management

of diabetes. 



Overall, her short of breath is likely multifactorial due to her diastolic heart

failure and renal insufficiency.  Despite the fact that her echo was normal, it is

still possible that this could be both cardiac and renal in etiology.  If long-term

prognosis wise, the patient continues to experience short of breath despite p.o.

medication, diuretics then it may be possible she may need dialysis, especially if

her renal function continue to decline.  Recommendations were made to not take

medications from [place] and to try and stick with the prescribed medications.  All

questions were answered.  Instructions were given to follow up in outpatient to

continue her routine care. 



DISPOSITION:  Stable.



DISCHARGE INSTRUCTIONS:  

1. Location:  Home.

2. Diet:  Heart healthy, fluid restricted diabetic diet.

3. Activity:  As tolerated.

4. Followup:

a. Please follow up with [facility], Dr. Stormy Salcido, to establish

care. 

    b. Please follow up with Nephrology, Dr. Angelito Salcido.

    c. Please follow up with Urology, Dr. Lacey Torres for outpatient cystoscopy.







Job ID:  [id number]